# Patient Record
Sex: FEMALE | Race: BLACK OR AFRICAN AMERICAN | NOT HISPANIC OR LATINO | URBAN - METROPOLITAN AREA
[De-identification: names, ages, dates, MRNs, and addresses within clinical notes are randomized per-mention and may not be internally consistent; named-entity substitution may affect disease eponyms.]

---

## 2024-05-03 ENCOUNTER — INPATIENT (INPATIENT)
Facility: HOSPITAL | Age: 59
LOS: 0 days | Discharge: HOME | DRG: 277 | End: 2024-05-04
Attending: INTERNAL MEDICINE | Admitting: INTERNAL MEDICINE
Payer: COMMERCIAL

## 2024-05-03 VITALS — HEIGHT: 63 IN | WEIGHT: 190.04 LBS

## 2024-05-03 DIAGNOSIS — Z95.5 PRESENCE OF CORONARY ANGIOPLASTY IMPLANT AND GRAFT: Chronic | ICD-10-CM

## 2024-05-03 DIAGNOSIS — Z95.810 PRESENCE OF AUTOMATIC (IMPLANTABLE) CARDIAC DEFIBRILLATOR: Chronic | ICD-10-CM

## 2024-05-03 LAB
B-OH-BUTYR SERPL-SCNC: 0.2 MMOL/L — SIGNIFICANT CHANGE UP
GLUCOSE BLDC GLUCOMTR-MCNC: 131 MG/DL — HIGH (ref 70–99)
GLUCOSE BLDC GLUCOMTR-MCNC: 168 MG/DL — HIGH (ref 70–99)
ISTAT INR: 1.2 — HIGH (ref 0.88–1.16)
ISTAT PT: 14 SEC — HIGH (ref 10–12.9)
ISTAT VENOUS BE: -4 MMOL/L — LOW (ref -2–3)
ISTAT VENOUS GLUCOSE: 161 MG/DL — HIGH (ref 70–99)
ISTAT VENOUS HCO3: 22 MMOL/L — LOW (ref 23–28)
ISTAT VENOUS HEMATOCRIT: 37 % — SIGNIFICANT CHANGE UP (ref 34.5–45)
ISTAT VENOUS HEMOGLOBIN: 12.6 GM/DL — SIGNIFICANT CHANGE UP (ref 11.5–15.5)
ISTAT VENOUS IONIZED CALCIUM: 1.3 MMOL/L — SIGNIFICANT CHANGE UP (ref 1.12–1.3)
ISTAT VENOUS PCO2: 42 MMHG — SIGNIFICANT CHANGE UP (ref 41–51)
ISTAT VENOUS PH: 7.32 — SIGNIFICANT CHANGE UP (ref 7.31–7.41)
ISTAT VENOUS PO2: <66 MMHG — LOW (ref 35–40)
ISTAT VENOUS POTASSIUM: 4.1 MMOL/L — SIGNIFICANT CHANGE UP (ref 3.5–5.3)
ISTAT VENOUS SO2: 38 % — SIGNIFICANT CHANGE UP
ISTAT VENOUS SODIUM: 142 MMOL/L — SIGNIFICANT CHANGE UP (ref 135–145)
ISTAT VENOUS TCO2: 23 MMOL/L — SIGNIFICANT CHANGE UP (ref 22–31)
POCT ISTAT CREATININE: 1.8 MG/DL — HIGH (ref 0.5–1.3)

## 2024-05-03 PROCEDURE — 33264 RMVL & RPLCMT DFB GEN MLT LD: CPT

## 2024-05-03 PROCEDURE — 33225 L VENTRIC PACING LEAD ADD-ON: CPT

## 2024-05-03 PROCEDURE — 99222 1ST HOSP IP/OBS MODERATE 55: CPT | Mod: 57

## 2024-05-03 RX ORDER — HYDRALAZINE HCL 50 MG
100 TABLET ORAL THREE TIMES A DAY
Refills: 0 | Status: DISCONTINUED | OUTPATIENT
Start: 2024-05-03 | End: 2024-05-04

## 2024-05-03 RX ORDER — SEMAGLUTIDE 0.68 MG/ML
0.5 INJECTION, SOLUTION SUBCUTANEOUS
Refills: 0 | DISCHARGE

## 2024-05-03 RX ORDER — DEXTROSE 50 % IN WATER 50 %
25 SYRINGE (ML) INTRAVENOUS ONCE
Refills: 0 | Status: DISCONTINUED | OUTPATIENT
Start: 2024-05-03 | End: 2024-05-04

## 2024-05-03 RX ORDER — ASPIRIN/CALCIUM CARB/MAGNESIUM 324 MG
81 TABLET ORAL DAILY
Refills: 0 | Status: DISCONTINUED | OUTPATIENT
Start: 2024-05-03 | End: 2024-05-04

## 2024-05-03 RX ORDER — DEXTROSE 10 % IN WATER 10 %
125 INTRAVENOUS SOLUTION INTRAVENOUS ONCE
Refills: 0 | Status: DISCONTINUED | OUTPATIENT
Start: 2024-05-03 | End: 2024-05-04

## 2024-05-03 RX ORDER — CARVEDILOL PHOSPHATE 80 MG/1
2 CAPSULE, EXTENDED RELEASE ORAL
Refills: 0 | DISCHARGE

## 2024-05-03 RX ORDER — ASPIRIN/CALCIUM CARB/MAGNESIUM 324 MG
0 TABLET ORAL
Refills: 0 | DISCHARGE

## 2024-05-03 RX ORDER — EMPAGLIFLOZIN 10 MG/1
1 TABLET, FILM COATED ORAL
Refills: 0 | DISCHARGE

## 2024-05-03 RX ORDER — SODIUM CHLORIDE 9 MG/ML
1000 INJECTION, SOLUTION INTRAVENOUS
Refills: 0 | Status: DISCONTINUED | OUTPATIENT
Start: 2024-05-03 | End: 2024-05-04

## 2024-05-03 RX ORDER — LOSARTAN POTASSIUM 100 MG/1
1 TABLET, FILM COATED ORAL
Refills: 0 | DISCHARGE

## 2024-05-03 RX ORDER — PANTOPRAZOLE SODIUM 20 MG/1
40 TABLET, DELAYED RELEASE ORAL
Refills: 0 | Status: DISCONTINUED | OUTPATIENT
Start: 2024-05-03 | End: 2024-05-04

## 2024-05-03 RX ORDER — ATORVASTATIN CALCIUM 80 MG/1
80 TABLET, FILM COATED ORAL AT BEDTIME
Refills: 0 | Status: DISCONTINUED | OUTPATIENT
Start: 2024-05-03 | End: 2024-05-04

## 2024-05-03 RX ORDER — CLOPIDOGREL BISULFATE 75 MG/1
75 TABLET, FILM COATED ORAL DAILY
Refills: 0 | Status: DISCONTINUED | OUTPATIENT
Start: 2024-05-03 | End: 2024-05-04

## 2024-05-03 RX ORDER — SPIRONOLACTONE 25 MG/1
25 TABLET, FILM COATED ORAL DAILY
Refills: 0 | Status: DISCONTINUED | OUTPATIENT
Start: 2024-05-03 | End: 2024-05-04

## 2024-05-03 RX ORDER — OMEPRAZOLE 10 MG/1
1 CAPSULE, DELAYED RELEASE ORAL
Refills: 0 | DISCHARGE

## 2024-05-03 RX ORDER — ISOSORBIDE DINITRATE 5 MG/1
1 TABLET ORAL
Refills: 0 | DISCHARGE

## 2024-05-03 RX ORDER — FUROSEMIDE 40 MG
1 TABLET ORAL
Refills: 0 | DISCHARGE

## 2024-05-03 RX ORDER — SPIRONOLACTONE 25 MG/1
1 TABLET, FILM COATED ORAL
Refills: 0 | DISCHARGE

## 2024-05-03 RX ORDER — INSULIN DETEMIR 100/ML (3)
50 INSULIN PEN (ML) SUBCUTANEOUS
Refills: 0 | DISCHARGE

## 2024-05-03 RX ORDER — ISOSORBIDE DINITRATE 5 MG/1
20 TABLET ORAL THREE TIMES A DAY
Refills: 0 | Status: DISCONTINUED | OUTPATIENT
Start: 2024-05-03 | End: 2024-05-04

## 2024-05-03 RX ORDER — HYDRALAZINE HCL 50 MG
1 TABLET ORAL
Refills: 0 | DISCHARGE

## 2024-05-03 RX ORDER — DEXTROSE 50 % IN WATER 50 %
12.5 SYRINGE (ML) INTRAVENOUS ONCE
Refills: 0 | Status: DISCONTINUED | OUTPATIENT
Start: 2024-05-03 | End: 2024-05-04

## 2024-05-03 RX ORDER — GLUCAGON INJECTION, SOLUTION 0.5 MG/.1ML
1 INJECTION, SOLUTION SUBCUTANEOUS ONCE
Refills: 0 | Status: DISCONTINUED | OUTPATIENT
Start: 2024-05-03 | End: 2024-05-04

## 2024-05-03 RX ORDER — DEXTROSE 50 % IN WATER 50 %
15 SYRINGE (ML) INTRAVENOUS ONCE
Refills: 0 | Status: DISCONTINUED | OUTPATIENT
Start: 2024-05-03 | End: 2024-05-04

## 2024-05-03 RX ORDER — CARVEDILOL PHOSPHATE 80 MG/1
50 CAPSULE, EXTENDED RELEASE ORAL EVERY 12 HOURS
Refills: 0 | Status: DISCONTINUED | OUTPATIENT
Start: 2024-05-03 | End: 2024-05-04

## 2024-05-03 RX ORDER — INSULIN LISPRO 100/ML
VIAL (ML) SUBCUTANEOUS
Refills: 0 | Status: DISCONTINUED | OUTPATIENT
Start: 2024-05-03 | End: 2024-05-04

## 2024-05-03 RX ORDER — METFORMIN HYDROCHLORIDE 850 MG/1
1 TABLET ORAL
Refills: 0 | DISCHARGE

## 2024-05-03 RX ORDER — ATORVASTATIN CALCIUM 80 MG/1
1 TABLET, FILM COATED ORAL
Refills: 0 | DISCHARGE

## 2024-05-03 RX ORDER — LOSARTAN POTASSIUM 100 MG/1
100 TABLET, FILM COATED ORAL DAILY
Refills: 0 | Status: DISCONTINUED | OUTPATIENT
Start: 2024-05-03 | End: 2024-05-04

## 2024-05-03 RX ORDER — CLOPIDOGREL BISULFATE 75 MG/1
1 TABLET, FILM COATED ORAL
Refills: 0 | DISCHARGE

## 2024-05-03 RX ADMIN — Medication 100 MILLIGRAM(S): at 21:46

## 2024-05-03 RX ADMIN — CARVEDILOL PHOSPHATE 50 MILLIGRAM(S): 80 CAPSULE, EXTENDED RELEASE ORAL at 19:49

## 2024-05-03 RX ADMIN — ATORVASTATIN CALCIUM 80 MILLIGRAM(S): 80 TABLET, FILM COATED ORAL at 21:46

## 2024-05-03 RX ADMIN — Medication 2: at 19:38

## 2024-05-03 NOTE — H&P ADULT - ASSESSMENT
59 yo F with history of CVA, DM, HTN, history of cancer, CAD, s/p MI, s/p stent , cardiomyopathy, s/p  dual  chamber ICD implanted at [facility] 2023 presented for scheduled upgrade to BiV ICD.

## 2024-05-03 NOTE — H&P ADULT - HISTORY OF PRESENT ILLNESS
57 yo F with history of CVA, DM, HTN, history of cancer, CAD, s/p MI, s/p stent , cardiomyopathy, s/p  dual  chamber ICD implanted at [facility] 2023 presented for scheduled upgrade to BiV ICD.   Patient states that she is  complaint with her medications, denies chest pain, SOB, palpitations, dizziness, ICD check - normal function, not dependent .

## 2024-05-03 NOTE — CHART NOTE - NSCHARTNOTEFT_GEN_A_CORE
EPS BRIEF OP NOTE    REMY MONTEIRO  [id number]    PROCEDURE:  - CRT upgrade    INDICATION:  - ICM  - LBBB  - NYHA II HF symptoms    ELECTROPHYSIOLOGIST(S):  - Dr. Chao (Attending)  - Dr. Olguin (Fellow)    ANESTHESIOLOGY:  - Dr. Rios    SEDATION TYPE:  - General  - Local    FINDINGS:  - Access via L axillary 2nd rib stick  - Successful implantation of a LV lead in a lateral branch of the CS. Device upgraded to [employer] CRT-D system    COMPLICATIONS:  - None    RECOMMENDATIONS:  - CXR PA/Lateral in AM  - Continue home meds  - Dispo: Admit for overnight monitoring    Please refer to the full report to follow in [facility] for a detailed description of this case.    --  Igor Olguin MD  Electrophysiology PGY8

## 2024-05-03 NOTE — PRE-ANESTHESIA EVALUATION ADULT - NSANTHPMHFT_GEN_ALL_CORE
Per patient, stroke > 10 years ago with residual R sided upper and lower extremity weakness. H/o heart attack x 2 s/p cardiac stents (last placed in 2022). Otherwise, no CP/SOB/N/V/GERD. METS < 4 (walks with assistance). All chronic conditions stable.

## 2024-05-03 NOTE — PACU DISCHARGE NOTE - NAUSEA/VOMITING:
None
Note Text (......Xxx Chief Complaint.): This diagnosis correlates with the
Detail Level: Detailed
Render Risk Assessment In Note?: no
Other (Free Text): Treated with LN2 no charge per JW

## 2024-05-03 NOTE — PRE-ANESTHESIA EVALUATION ADULT - NSANTHADDINFOFT_GEN_ALL_CORE
D/w patient and surgical team plan for GA given DM, and Ozempic/Jardiance use. Will plan for RSI. Beta hydroxybutyrate sent.

## 2024-05-03 NOTE — H&P ADULT - NSICDXPASTMEDICALHX_GEN_ALL_CORE_FT
PAST MEDICAL HISTORY:  DM (diabetes mellitus)     History of CVA in adulthood     History of heart failure     HTN (hypertension)

## 2024-05-04 ENCOUNTER — TRANSCRIPTION ENCOUNTER (OUTPATIENT)
Age: 59
End: 2024-05-04

## 2024-05-04 VITALS
SYSTOLIC BLOOD PRESSURE: 122 MMHG | OXYGEN SATURATION: 96 % | RESPIRATION RATE: 16 BRPM | HEART RATE: 94 BPM | DIASTOLIC BLOOD PRESSURE: 68 MMHG

## 2024-05-04 DIAGNOSIS — I50.22 CHRONIC SYSTOLIC (CONGESTIVE) HEART FAILURE: ICD-10-CM

## 2024-05-04 LAB
GLUCOSE BLDC GLUCOMTR-MCNC: 110 MG/DL — HIGH (ref 70–99)
GLUCOSE BLDC GLUCOMTR-MCNC: 126 MG/DL — HIGH (ref 70–99)

## 2024-05-04 PROCEDURE — C1889: CPT

## 2024-05-04 PROCEDURE — 84295 ASSAY OF SERUM SODIUM: CPT

## 2024-05-04 PROCEDURE — C1898: CPT

## 2024-05-04 PROCEDURE — C1769: CPT

## 2024-05-04 PROCEDURE — C1730: CPT

## 2024-05-04 PROCEDURE — 82947 ASSAY GLUCOSE BLOOD QUANT: CPT

## 2024-05-04 PROCEDURE — C1882: CPT

## 2024-05-04 PROCEDURE — C1900: CPT

## 2024-05-04 PROCEDURE — 85014 HEMATOCRIT: CPT

## 2024-05-04 PROCEDURE — C1893: CPT

## 2024-05-04 PROCEDURE — 82803 BLOOD GASES ANY COMBINATION: CPT

## 2024-05-04 PROCEDURE — 82010 KETONE BODYS QUAN: CPT

## 2024-05-04 PROCEDURE — 82962 GLUCOSE BLOOD TEST: CPT

## 2024-05-04 PROCEDURE — 84132 ASSAY OF SERUM POTASSIUM: CPT

## 2024-05-04 PROCEDURE — 85610 PROTHROMBIN TIME: CPT

## 2024-05-04 PROCEDURE — 71046 X-RAY EXAM CHEST 2 VIEWS: CPT | Mod: 26

## 2024-05-04 PROCEDURE — 71046 X-RAY EXAM CHEST 2 VIEWS: CPT

## 2024-05-04 PROCEDURE — C9399: CPT

## 2024-05-04 PROCEDURE — 82330 ASSAY OF CALCIUM: CPT

## 2024-05-04 PROCEDURE — C1777: CPT

## 2024-05-04 PROCEDURE — 82565 ASSAY OF CREATININE: CPT

## 2024-05-04 PROCEDURE — 36415 COLL VENOUS BLD VENIPUNCTURE: CPT

## 2024-05-04 RX ORDER — BENZOCAINE AND MENTHOL 5; 1 G/100ML; G/100ML
1 LIQUID ORAL ONCE
Refills: 0 | Status: COMPLETED | OUTPATIENT
Start: 2024-05-04 | End: 2024-05-04

## 2024-05-04 RX ORDER — ACETAMINOPHEN 500 MG
650 TABLET ORAL ONCE
Refills: 0 | Status: COMPLETED | OUTPATIENT
Start: 2024-05-04 | End: 2024-05-04

## 2024-05-04 RX ORDER — INFLUENZA VIRUS VACCINE 15; 15; 15; 15 UG/.5ML; UG/.5ML; UG/.5ML; UG/.5ML
0.5 SUSPENSION INTRAMUSCULAR ONCE
Refills: 0 | Status: DISCONTINUED | OUTPATIENT
Start: 2024-05-04 | End: 2024-05-04

## 2024-05-04 RX ADMIN — BENZOCAINE AND MENTHOL 1 LOZENGE: 5; 1 LIQUID ORAL at 05:49

## 2024-05-04 RX ADMIN — CLOPIDOGREL BISULFATE 75 MILLIGRAM(S): 75 TABLET, FILM COATED ORAL at 12:15

## 2024-05-04 RX ADMIN — ISOSORBIDE DINITRATE 20 MILLIGRAM(S): 5 TABLET ORAL at 05:43

## 2024-05-04 RX ADMIN — Medication 100 MILLIGRAM(S): at 13:17

## 2024-05-04 RX ADMIN — Medication 81 MILLIGRAM(S): at 12:15

## 2024-05-04 RX ADMIN — Medication 650 MILLIGRAM(S): at 06:41

## 2024-05-04 RX ADMIN — Medication 650 MILLIGRAM(S): at 05:41

## 2024-05-04 RX ADMIN — Medication 100 MILLIGRAM(S): at 09:11

## 2024-05-04 RX ADMIN — PANTOPRAZOLE SODIUM 40 MILLIGRAM(S): 20 TABLET, DELAYED RELEASE ORAL at 05:49

## 2024-05-04 RX ADMIN — ISOSORBIDE DINITRATE 20 MILLIGRAM(S): 5 TABLET ORAL at 12:15

## 2024-05-04 RX ADMIN — CARVEDILOL PHOSPHATE 50 MILLIGRAM(S): 80 CAPSULE, EXTENDED RELEASE ORAL at 07:37

## 2024-05-04 RX ADMIN — SPIRONOLACTONE 25 MILLIGRAM(S): 25 TABLET, FILM COATED ORAL at 05:42

## 2024-05-04 RX ADMIN — LOSARTAN POTASSIUM 100 MILLIGRAM(S): 100 TABLET, FILM COATED ORAL at 05:43

## 2024-05-04 NOTE — DISCHARGE NOTE PROVIDER - CARE PROVIDER_API CALL
Bal Bernal  Cardiac Electrophysiology  [address] [address]  Phone: [phone]  Fax: [phone]  Follow Up Time:

## 2024-05-04 NOTE — PATIENT PROFILE ADULT - FALL HARM RISK - HARM RISK INTERVENTIONS
Assistance with ambulation/Assistance OOB with selected safe patient handling equipment/Communicate Risk of Fall with Harm to all staff/Discuss with provider need for PT consult/Monitor gait and stability/Provide patient with walking aids - walker, cane, crutches/Reinforce activity limits and safety measures with patient and family/Sit up slowly, dangle for a short time, stand at bedside before walking/Tailored Fall Risk Interventions/Use of alarms - bed, chair and/or voice tab/Visual Cue: Yellow wristband and red socks/Bed in lowest position, wheels locked, appropriate side rails in place/Call bell, personal items and telephone in reach/Instruct patient to call for assistance before getting out of bed or chair/Non-slip footwear when patient is out of bed/[place] to call system/Physically safe environment - no spills, clutter or unnecessary equipment/Purposeful Proactive Rounding/Room/bathroom lighting operational, light cord in reach

## 2024-05-04 NOTE — DISCHARGE NOTE PROVIDER - NSDCMRMEDTOKEN_GEN_ALL_CORE_FT
aspirin 81 mg oral tablet: orally once a day  atorvastatin 80 mg oral tablet: 1 tab(s) orally once a day  carvedilol 25 mg oral tablet: 2 tab(s) orally 2 times a day  furosemide 40 mg oral tablet: 1 tab(s) orally every other day  hydrALAZINE 100 mg oral tablet: 1 tab(s) orally 3 times a day  isosorbide dinitrate 20 mg oral tablet: 1 tab(s) orally 3 times a day  Jardiance 10 mg oral tablet: 1 tab(s) orally once a day  Levemir FlexPen 100 units/mL subcutaneous solution: 50 unit(s) subcutaneous  losartan 100 mg oral tablet: 1 tab(s) orally once a day  metFORMIN 1000 mg oral tablet: 1 tab(s) orally 2 times a day  omeprazole 20 mg oral delayed release tablet: 1 tab(s) orally once a day  Plavix 75 mg oral tablet: 1 tab(s) orally once a day  semaglutide 0.5 mg/0.5 mL (0.5 mg dose) subcutaneous solution: 0.5 milligram(s) subcutaneously  spironolactone 25 mg oral tablet: 1 tab(s) orally once a day

## 2024-05-04 NOTE — PROCEDURE NOTE - ADDITIONAL PROCEDURE DETAILS
Biventricular ICD Programming    Diagnosis: Cardiomyopathy  Company: [employer]  Implant date: 5/3/2024  Device: RESONATE HF CRTD ([id number])  Battery longevity: >8 years  Mode: DDD   Presenting Rhythm: AS-  Pacing percent:     AP: <1%     : 100%  Impedance (Ohms): RA - 621; RV - 404; LV - 1151/744; HV - 51  Capture threshold (V @ ms): RA - 0.7 @ 0.4; RV - 0.5 @ 0.4; LV - 0.7 @ 0.5  Programmed output (V @ ms): RA - trend 2.0 @ 0.4; RV - trend 2.0 @ 0.4; LV - trend 3.5 @ 0.5  Sensing (mV): RA - 3.7; RV - 14.0; LV - 4.1  Sensitivity (mV): RA - 0.5; RV - 0.6; LV - 1.0  Tachy Settings:  VT - >200bpm, burst, then max shocks  VF - >240bpm, max shocks    Events: No significant arrhythmias  Changes: None    Impression: Normal device function with no arrhythmias.    CXR pending  Remove dressing tomorrow morning  F/up with EP

## 2024-05-04 NOTE — DISCHARGE NOTE NURSING/CASE MANAGEMENT/SOCIAL WORK - PATIENT PORTAL LINK FT
You can access the FollowMyHealth Patient Portal offered by [facility] by registering at the following website: [email or web address]. By joining [facility]’s FollowMyHealth portal, you will also be able to view your health information using other applications (apps) compatible with our system.

## 2024-05-04 NOTE — PROCEDURE NOTE - NSICDXPROCEDURE_GEN_ALL_CORE_FT
PROCEDURES:  Programming of multiple lead implantable cardioverter-defibrillator (ICD) 04-May-2024 10:43:30  Austin Yanez

## 2024-05-04 NOTE — DISCHARGE NOTE PROVIDER - NSDCCPCAREPLAN_GEN_ALL_CORE_FT
PRINCIPAL DISCHARGE DIAGNOSIS  Diagnosis: Status post biventricular cardiac pacemaker insertion  Assessment and Plan of Treatment: - Your device was upgraded to a BiV ICDon 5/3/24.   Do not lift left arm above shoulder or lift heavy items with left arm for 1 month. You can shower 2 days later. Keep incision site clean and dry. Do not remove the glue on the incision. Let it fall off on its own.   Call our doctor  if any questions about the wound -- such as bleeding, swelling, increasing pain or redness.  [phone]. Please call to make a follow up appointment with the Electrophysiology team in 1-2 weeks to check the ICD.        PRINCIPAL DISCHARGE DIAGNOSIS  Diagnosis: Status post biventricular cardiac pacemaker insertion  Assessment and Plan of Treatment: - Your device was upgraded to a BiV ICDon 5/3/24.   - Please remove dressing on 5/5.   Do not lift left arm above shoulder or lift heavy items with left arm for 1 month. You can shower 2 days later. Keep incision site clean and dry. Do not remove the glue on the incision. Let it fall off on its own.   Call our doctor  if any questions about the wound -- such as bleeding, swelling, increasing pain or redness.  [phone]. Please call to make a follow up appointment with the Electrophysiology team in 1-2 weeks to check the ICD.

## 2024-05-04 NOTE — PATIENT PROFILE ADULT - DO YOU FEEL LIKE HURTING YOURSELF OR OTHERS?
I spoke with Ronda from pharmacy, scripted has been cancelled.   
Refill approved as requested.  
Refill needs more information for dosing.   
no

## 2024-05-04 NOTE — DISCHARGE NOTE PROVIDER - NSDCQMERRANDS_GEN_ALL_CORE
----- Message from Angelita Gao sent at 1/8/2018  3:21 PM CST -----  Rescheduled pt medical screening to 1/17 @ 10:30. I blocked off the time in the pain clinic for that time. Please let me know if I need to change the date or the time. Thanks   No

## 2024-05-04 NOTE — PATIENT PROFILE ADULT - FUNCTIONAL ASSESSMENT - BASIC MOBILITY 6.
Not able to assess (calculate score using AMPAC averaging method) 4-calculated by average/Not able to assess (calculate score using [facility] averaging method)

## 2024-05-04 NOTE — DISCHARGE NOTE PROVIDER - HOSPITAL COURSE
59 yo F with history of CVA, DM, HTN, history of cancer, CAD, s/p MI, s/p stent , cardiomyopathy, s/p  dual  chamber ICD implanted at [facility] 2023 presented for scheduled upgrade to BiV ICD. Pt s/p Successful implantation of a LV lead in a lateral branch of the CS. Device upgraded to [employer] CRT-D system on 5/3.   CXR 5/4: Frontal and lateral examination of the chest demonstrates the heart to be within normal limits in transverse diameter. No acute infiltrates. Pacemaker overlies left chest wall. Tips of pacer wires overlie right atrium and right ventricle respectively. Mild dextroscoliosis thoracic spine with degenerative changes.  Device interrogation 5/4: Normal device function with no arrhythmias.  Pt. admitted o/n for monitoring. Pt. seen and examined at bedside today am. Pt. comfortable, denies any CP, SOB, dizziness, palpitations, c/o mild soreness at access site; site is stable, seen by EP DR. Og and stable to be d/c home as per DR. Yanez with rec to remove dressing earline (pt aware), and f/u with EP in 2 weeks for wound check.  Patient has been given appropriate discharge instructions including medication regimen, access site management and follow up.

## 2024-05-07 LAB
ISTAT ARTERIAL BE: -4 MMOL/L — LOW (ref -2–3)
ISTAT ARTERIAL GLUCOSE: 152 MG/DL — HIGH (ref 70–99)
ISTAT ARTERIAL HCO3: 21 MMOL/L — LOW (ref 22–26)
ISTAT ARTERIAL HEMATOCRIT: 31 % — LOW (ref 34.5–45)
ISTAT ARTERIAL HEMOGLOBIN: 10.5 G/DL — LOW (ref 11.5–15.5)
ISTAT ARTERIAL IONIZED CALCIUM: 1.27 MMOL/L — SIGNIFICANT CHANGE UP (ref 1.12–1.3)
ISTAT ARTERIAL PCO2: 39 MMHG — SIGNIFICANT CHANGE UP (ref 35–45)
ISTAT ARTERIAL PH: 7.34 — LOW (ref 7.35–7.45)
ISTAT ARTERIAL PO2: 333 MMHG — HIGH (ref 80–105)
ISTAT ARTERIAL POTASSIUM: 4 MMOL/L — SIGNIFICANT CHANGE UP (ref 3.5–5.3)
ISTAT ARTERIAL SO2: 100 % — HIGH (ref 95–98)
ISTAT ARTERIAL SODIUM: 141 MMOL/L — SIGNIFICANT CHANGE UP (ref 135–145)
ISTAT ARTERIAL TCO2: 22 MMOL/L — SIGNIFICANT CHANGE UP (ref 22–31)

## 2024-05-08 DIAGNOSIS — I25.5 ISCHEMIC CARDIOMYOPATHY: ICD-10-CM

## 2024-05-08 DIAGNOSIS — Z79.84 LONG TERM (CURRENT) USE OF ORAL HYPOGLYCEMIC DRUGS: ICD-10-CM

## 2024-05-08 DIAGNOSIS — I25.10 ATHEROSCLEROTIC HEART DISEASE OF NATIVE CORONARY ARTERY WITHOUT ANGINA PECTORIS: ICD-10-CM

## 2024-05-08 DIAGNOSIS — Z95.810 PRESENCE OF AUTOMATIC (IMPLANTABLE) CARDIAC DEFIBRILLATOR: ICD-10-CM

## 2024-05-08 DIAGNOSIS — Z79.02 LONG TERM (CURRENT) USE OF ANTITHROMBOTICS/ANTIPLATELETS: ICD-10-CM

## 2024-05-08 DIAGNOSIS — I50.22 CHRONIC SYSTOLIC (CONGESTIVE) HEART FAILURE: ICD-10-CM

## 2024-05-08 DIAGNOSIS — I25.2 OLD MYOCARDIAL INFARCTION: ICD-10-CM

## 2024-05-08 DIAGNOSIS — I11.0 HYPERTENSIVE HEART DISEASE WITH HEART FAILURE: ICD-10-CM

## 2024-05-08 DIAGNOSIS — Z79.82 LONG TERM (CURRENT) USE OF ASPIRIN: ICD-10-CM

## 2024-05-08 DIAGNOSIS — Z95.5 PRESENCE OF CORONARY ANGIOPLASTY IMPLANT AND GRAFT: ICD-10-CM

## 2024-05-08 DIAGNOSIS — Z86.73 PERSONAL HISTORY OF TRANSIENT ISCHEMIC ATTACK (TIA), AND CEREBRAL INFARCTION WITHOUT RESIDUAL DEFICITS: ICD-10-CM

## 2024-05-08 DIAGNOSIS — E11.9 TYPE 2 DIABETES MELLITUS WITHOUT COMPLICATIONS: ICD-10-CM

## 2024-05-08 PROBLEM — Z86.79 PERSONAL HISTORY OF OTHER DISEASES OF THE CIRCULATORY SYSTEM: Chronic | Status: ACTIVE | Noted: 2024-05-03

## 2024-05-08 PROBLEM — I10 ESSENTIAL (PRIMARY) HYPERTENSION: Chronic | Status: ACTIVE | Noted: 2024-05-03

## 2024-06-06 ENCOUNTER — APPOINTMENT (OUTPATIENT)
Dept: HEART AND VASCULAR | Facility: CLINIC | Age: 59
End: 2024-06-06
Payer: COMMERCIAL

## 2024-06-06 ENCOUNTER — NON-APPOINTMENT (OUTPATIENT)
Age: 59
End: 2024-06-06

## 2024-06-06 VITALS
TEMPERATURE: 98.9 F | BODY MASS INDEX: 34.04 KG/M2 | DIASTOLIC BLOOD PRESSURE: 90 MMHG | WEIGHT: 185 LBS | RESPIRATION RATE: 18 BRPM | HEART RATE: 99 BPM | SYSTOLIC BLOOD PRESSURE: 162 MMHG | HEIGHT: 62 IN | OXYGEN SATURATION: 98 %

## 2024-06-06 DIAGNOSIS — I25.5 ISCHEMIC CARDIOMYOPATHY: ICD-10-CM

## 2024-06-06 PROBLEM — Z00.00 ENCOUNTER FOR PREVENTIVE HEALTH EXAMINATION: Status: ACTIVE | Noted: 2024-06-06

## 2024-06-06 PROCEDURE — 93284 PRGRMG EVAL IMPLANTABLE DFB: CPT

## 2024-06-10 PROBLEM — I25.5 ISCHEMIC CARDIOMYOPATHY: Status: ACTIVE | Noted: 2024-06-10

## 2024-06-17 NOTE — PROCEDURE
[No] : not [NSR] : normal sinus rhythm [DDD] : DDD [Threshold Testing Performed] : Threshold testing was performed [Lead Imp:  ___ohms] : lead impedance was [unfilled] ohms [Sensing Amplitude ___mv] : sensing amplitude was [unfilled] mv [___V @] : [unfilled] V [___ ms] : [unfilled] ms [de-identified] : [facility]  [de-identified] : Begining of danelleic

## 2024-06-17 NOTE — DISCUSSION/SUMMARY
[FreeTextEntry1] :  58 year old female with CAD s/p PCI, HTN, CVA, diabetes and ischemic cardiomyopathy s/p upgrade to BiV ICD 5/2024 who presents for follow up.  Device interrogation reveals normal function and all measured data is within normal limits   Incision is well healed.  She has some diaphragmatic stimulation and LV vector changed (unable to reproduce).  Follow up in 6 months or sooner if needed.  She knows to call with any questions or concerns.

## 2024-06-17 NOTE — ADDENDUM
[FreeTextEntry1] : I, Bal Bernal, hereby attest that the medical record entry for this patient accurately reflects signatures/notations that I made on the Date of Service in my capacity as an Attending Physician when I treated/diagnosed the above patient. I do hereby attest that this information is true, accurate and complete to the best of my knowledge and I understand that any falsification, omission, or concealment of material fact may subject me to administrative, civil, or, criminal liability. I agree with the note as written by my PA in its entirety. I was present for the entire visit and supervised the entire visit and agree with the plan as outlined.  I, Sameer Mckeon, am scribing for and the presence of Dr. Bernal the following sections: HPI, PMH,Family/social history, ROS, Physical Exam, Assessment / Plan.

## 2024-06-17 NOTE — HISTORY OF PRESENT ILLNESS
[de-identified] :  58 year old female with CAD s/p PCI, HTN, CVA, diabetes and ischemic cardiomyopathy s/p upgrade to BiV ICD 5/2024 who presents for follow up.  She has been doing overall well since implant.  Sometimes she feels a pounding in her side.  This can happen in any position with no clear precipitating factors.  No chest pain, syncope, near syncope, edema or device related issues.